# Patient Record
Sex: FEMALE | Race: OTHER | NOT HISPANIC OR LATINO | URBAN - METROPOLITAN AREA
[De-identification: names, ages, dates, MRNs, and addresses within clinical notes are randomized per-mention and may not be internally consistent; named-entity substitution may affect disease eponyms.]

---

## 2019-12-26 ENCOUNTER — EMERGENCY (EMERGENCY)
Facility: HOSPITAL | Age: 29
LOS: 1 days | Discharge: HOME | End: 2019-12-26
Attending: EMERGENCY MEDICINE
Payer: MEDICAID

## 2019-12-26 VITALS
DIASTOLIC BLOOD PRESSURE: 69 MMHG | TEMPERATURE: 98 F | SYSTOLIC BLOOD PRESSURE: 121 MMHG | HEART RATE: 76 BPM | OXYGEN SATURATION: 100 %

## 2019-12-26 VITALS
OXYGEN SATURATION: 100 % | WEIGHT: 184.97 LBS | HEART RATE: 81 BPM | TEMPERATURE: 98 F | HEIGHT: 63 IN | DIASTOLIC BLOOD PRESSURE: 83 MMHG | RESPIRATION RATE: 17 BRPM | SYSTOLIC BLOOD PRESSURE: 119 MMHG

## 2019-12-26 PROCEDURE — 99283 EMERGENCY DEPT VISIT LOW MDM: CPT

## 2019-12-26 PROCEDURE — 99283 EMERGENCY DEPT VISIT LOW MDM: CPT | Mod: 25

## 2019-12-26 PROCEDURE — 96372 THER/PROPH/DIAG INJ SC/IM: CPT

## 2019-12-26 RX ORDER — KETOROLAC TROMETHAMINE 30 MG/ML
30 SYRINGE (ML) INJECTION ONCE
Refills: 0 | Status: DISCONTINUED | OUTPATIENT
Start: 2019-12-26 | End: 2019-12-26

## 2019-12-26 RX ORDER — DIAZEPAM 5 MG
2 TABLET ORAL ONCE
Refills: 0 | Status: DISCONTINUED | OUTPATIENT
Start: 2019-12-26 | End: 2019-12-26

## 2019-12-26 RX ORDER — METHOCARBAMOL 500 MG/1
1 TABLET, FILM COATED ORAL
Qty: 20 | Refills: 0
Start: 2019-12-26 | End: 2019-12-30

## 2019-12-26 RX ORDER — GABAPENTIN 400 MG/1
400 CAPSULE ORAL ONCE
Refills: 0 | Status: COMPLETED | OUTPATIENT
Start: 2019-12-26 | End: 2019-12-26

## 2019-12-26 RX ADMIN — Medication 2 MILLIGRAM(S): at 17:17

## 2019-12-26 RX ADMIN — Medication 30 MILLIGRAM(S): at 17:47

## 2019-12-26 RX ADMIN — GABAPENTIN 400 MILLIGRAM(S): 400 CAPSULE ORAL at 17:17

## 2019-12-26 RX ADMIN — Medication 30 MILLIGRAM(S): at 17:17

## 2019-12-26 NOTE — ED ADULT NURSE NOTE - AREA
[FreeTextEntry1] : Patient examined, history and chart reviewed\par  discussed complications and benefits of injection in detail  patient consented to injection right foot \par injection of right foot with 0.5 cc betamethasone and 1cc of lidocaine 2%, patient demonstrated verbal \par Follow up in 4 weeks  
upper

## 2019-12-26 NOTE — ED PROVIDER NOTE - PATIENT PORTAL LINK FT
You can access the FollowMyHealth Patient Portal offered by [facility] by registering at the following website: [email or web address]. By joining [employer]’s FollowMyHealth portal, you will also be able to view your health information using other applications (apps) compatible with our system.

## 2019-12-26 NOTE — ED PROVIDER NOTE - OBJECTIVE STATEMENT
29 year old female presenting with left sided cervical back pain and occipital head pain x2 weeks. Patient states that the pain is at a 7-8/10. 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 Patient denies any history of trauma, nausea, vomiting, or any other acute complaints.

## 2019-12-26 NOTE — ED PROVIDER NOTE - MUSCULOSKELETAL, MLM
Patient with tenderness to left scapular area and posterior group of muscles. Reduced range of flexion and extension in posterior muscle groups.

## 2020-06-18 PROBLEM — Z78.9 OTHER SPECIFIED HEALTH STATUS: Chronic | Status: ACTIVE | Noted: 2019-12-26

## 2020-06-24 ENCOUNTER — ASOB RESULT (OUTPATIENT)
Age: 30
End: 2020-06-24

## 2020-06-24 ENCOUNTER — APPOINTMENT (OUTPATIENT)
Dept: ANTEPARTUM | Facility: CLINIC | Age: 30
End: 2020-06-24
Payer: MEDICAID

## 2020-06-24 PROCEDURE — 76813 OB US NUCHAL MEAS 1 GEST: CPT | Mod: 59

## 2020-06-24 PROCEDURE — 76801 OB US < 14 WKS SINGLE FETUS: CPT

## 2020-12-29 ENCOUNTER — TRANSCRIPTION ENCOUNTER (OUTPATIENT)
Age: 30
End: 2020-12-29

## 2020-12-30 ENCOUNTER — INPATIENT (INPATIENT)
Facility: HOSPITAL | Age: 30
LOS: 1 days | Discharge: HOME | End: 2021-01-01
Attending: OBSTETRICS & GYNECOLOGY | Admitting: OBSTETRICS & GYNECOLOGY
Payer: MEDICAID

## 2020-12-30 VITALS
DIASTOLIC BLOOD PRESSURE: 83 MMHG | HEART RATE: 94 BPM | RESPIRATION RATE: 19 BRPM | TEMPERATURE: 99 F | SYSTOLIC BLOOD PRESSURE: 134 MMHG

## 2020-12-30 DIAGNOSIS — O26.899 OTHER SPECIFIED PREGNANCY RELATED CONDITIONS, UNSPECIFIED TRIMESTER: ICD-10-CM

## 2020-12-30 DIAGNOSIS — Z34.80 ENCOUNTER FOR SUPERVISION OF OTHER NORMAL PREGNANCY, UNSPECIFIED TRIMESTER: ICD-10-CM

## 2020-12-30 DIAGNOSIS — O34.219 MATERNAL CARE FOR UNSPECIFIED TYPE SCAR FROM PREVIOUS CESAREAN DELIVERY: Chronic | ICD-10-CM

## 2020-12-30 DIAGNOSIS — Z3A.00 WEEKS OF GESTATION OF PREGNANCY NOT SPECIFIED: ICD-10-CM

## 2020-12-30 LAB
BASOPHILS # BLD AUTO: 0.02 K/UL — SIGNIFICANT CHANGE UP (ref 0–0.2)
BASOPHILS NFR BLD AUTO: 0.3 % — SIGNIFICANT CHANGE UP (ref 0–2)
EOSINOPHIL # BLD AUTO: 0.04 K/UL — SIGNIFICANT CHANGE UP (ref 0–0.5)
EOSINOPHIL NFR BLD AUTO: 0.6 % — SIGNIFICANT CHANGE UP (ref 0–6)
HCT VFR BLD CALC: 40 % — SIGNIFICANT CHANGE UP (ref 34.5–45)
HGB BLD-MCNC: 13.5 G/DL — SIGNIFICANT CHANGE UP (ref 11.5–15.5)
IMM GRANULOCYTES NFR BLD AUTO: 0.6 % — SIGNIFICANT CHANGE UP (ref 0–1.5)
LYMPHOCYTES # BLD AUTO: 1.4 K/UL — SIGNIFICANT CHANGE UP (ref 1–3.3)
LYMPHOCYTES # BLD AUTO: 21 % — SIGNIFICANT CHANGE UP (ref 13–44)
MCHC RBC-ENTMCNC: 30.3 PG — SIGNIFICANT CHANGE UP (ref 27–34)
MCHC RBC-ENTMCNC: 33.8 GM/DL — SIGNIFICANT CHANGE UP (ref 32–36)
MCV RBC AUTO: 89.9 FL — SIGNIFICANT CHANGE UP (ref 80–100)
MONOCYTES # BLD AUTO: 0.75 K/UL — SIGNIFICANT CHANGE UP (ref 0–0.9)
MONOCYTES NFR BLD AUTO: 11.2 % — SIGNIFICANT CHANGE UP (ref 2–14)
NEUTROPHILS # BLD AUTO: 4.43 K/UL — SIGNIFICANT CHANGE UP (ref 1.8–7.4)
NEUTROPHILS NFR BLD AUTO: 66.3 % — SIGNIFICANT CHANGE UP (ref 43–77)
NRBC # BLD: 0 /100 WBCS — SIGNIFICANT CHANGE UP (ref 0–0)
PLATELET # BLD AUTO: 328 K/UL — SIGNIFICANT CHANGE UP (ref 150–400)
RBC # BLD: 4.45 M/UL — SIGNIFICANT CHANGE UP (ref 3.8–5.2)
RBC # FLD: 12.5 % — SIGNIFICANT CHANGE UP (ref 10.3–14.5)
RH IG SCN BLD-IMP: POSITIVE — SIGNIFICANT CHANGE UP
T PALLIDUM AB TITR SER: NEGATIVE — SIGNIFICANT CHANGE UP
WBC # BLD: 6.68 K/UL — SIGNIFICANT CHANGE UP (ref 3.8–10.5)
WBC # FLD AUTO: 6.68 K/UL — SIGNIFICANT CHANGE UP (ref 3.8–10.5)

## 2020-12-30 PROCEDURE — 88302 TISSUE EXAM BY PATHOLOGIST: CPT | Mod: 26

## 2020-12-30 PROCEDURE — 58700 REMOVAL OF FALLOPIAN TUBE: CPT | Mod: AS

## 2020-12-30 PROCEDURE — 59514 CESAREAN DELIVERY ONLY: CPT | Mod: AS,U9

## 2020-12-30 RX ORDER — ACETAMINOPHEN 500 MG
975 TABLET ORAL
Refills: 0 | Status: DISCONTINUED | OUTPATIENT
Start: 2020-12-30 | End: 2021-01-01

## 2020-12-30 RX ORDER — AMPICILLIN TRIHYDRATE 250 MG
2 CAPSULE ORAL ONCE
Refills: 0 | Status: COMPLETED | OUTPATIENT
Start: 2020-12-30 | End: 2020-12-30

## 2020-12-30 RX ORDER — AMPICILLIN TRIHYDRATE 250 MG
1 CAPSULE ORAL EVERY 4 HOURS
Refills: 0 | Status: DISCONTINUED | OUTPATIENT
Start: 2020-12-30 | End: 2020-12-30

## 2020-12-30 RX ORDER — MAGNESIUM HYDROXIDE 400 MG/1
30 TABLET, CHEWABLE ORAL
Refills: 0 | Status: DISCONTINUED | OUTPATIENT
Start: 2020-12-30 | End: 2021-01-01

## 2020-12-30 RX ORDER — SIMETHICONE 80 MG/1
80 TABLET, CHEWABLE ORAL EVERY 4 HOURS
Refills: 0 | Status: DISCONTINUED | OUTPATIENT
Start: 2020-12-30 | End: 2021-01-01

## 2020-12-30 RX ORDER — INFLUENZA VIRUS VACCINE 15; 15; 15; 15 UG/.5ML; UG/.5ML; UG/.5ML; UG/.5ML
0.5 SUSPENSION INTRAMUSCULAR ONCE
Refills: 0 | Status: COMPLETED | OUTPATIENT
Start: 2020-12-30 | End: 2020-12-30

## 2020-12-30 RX ORDER — SODIUM CHLORIDE 9 MG/ML
1000 INJECTION, SOLUTION INTRAVENOUS
Refills: 0 | Status: DISCONTINUED | OUTPATIENT
Start: 2020-12-30 | End: 2020-12-30

## 2020-12-30 RX ORDER — IBUPROFEN 200 MG
600 TABLET ORAL EVERY 6 HOURS
Refills: 0 | Status: COMPLETED | OUTPATIENT
Start: 2020-12-30 | End: 2021-11-28

## 2020-12-30 RX ORDER — KETOROLAC TROMETHAMINE 30 MG/ML
30 SYRINGE (ML) INJECTION EVERY 6 HOURS
Refills: 0 | Status: DISCONTINUED | OUTPATIENT
Start: 2020-12-30 | End: 2021-01-01

## 2020-12-30 RX ORDER — HEPARIN SODIUM 5000 [USP'U]/ML
5000 INJECTION INTRAVENOUS; SUBCUTANEOUS EVERY 12 HOURS
Refills: 0 | Status: DISCONTINUED | OUTPATIENT
Start: 2020-12-30 | End: 2021-01-01

## 2020-12-30 RX ORDER — SODIUM CHLORIDE 9 MG/ML
1000 INJECTION, SOLUTION INTRAVENOUS
Refills: 0 | Status: DISCONTINUED | OUTPATIENT
Start: 2020-12-30 | End: 2020-12-31

## 2020-12-30 RX ORDER — METOCLOPRAMIDE HCL 10 MG
10 TABLET ORAL ONCE
Refills: 0 | Status: DISCONTINUED | OUTPATIENT
Start: 2020-12-30 | End: 2020-12-30

## 2020-12-30 RX ORDER — DIPHENHYDRAMINE HCL 50 MG
25 CAPSULE ORAL EVERY 6 HOURS
Refills: 0 | Status: DISCONTINUED | OUTPATIENT
Start: 2020-12-30 | End: 2021-01-01

## 2020-12-30 RX ORDER — SODIUM CHLORIDE 9 MG/ML
1000 INJECTION, SOLUTION INTRAVENOUS ONCE
Refills: 0 | Status: COMPLETED | OUTPATIENT
Start: 2020-12-30 | End: 2020-12-30

## 2020-12-30 RX ORDER — OXYCODONE HYDROCHLORIDE 5 MG/1
5 TABLET ORAL ONCE
Refills: 0 | Status: DISCONTINUED | OUTPATIENT
Start: 2020-12-30 | End: 2021-01-01

## 2020-12-30 RX ORDER — FAMOTIDINE 10 MG/ML
20 INJECTION INTRAVENOUS ONCE
Refills: 0 | Status: COMPLETED | OUTPATIENT
Start: 2020-12-30 | End: 2020-12-30

## 2020-12-30 RX ORDER — OXYTOCIN 10 UNIT/ML
333.33 VIAL (ML) INJECTION
Qty: 20 | Refills: 0 | Status: DISCONTINUED | OUTPATIENT
Start: 2020-12-30 | End: 2020-12-30

## 2020-12-30 RX ORDER — OXYCODONE HYDROCHLORIDE 5 MG/1
5 TABLET ORAL
Refills: 0 | Status: DISCONTINUED | OUTPATIENT
Start: 2020-12-30 | End: 2021-01-01

## 2020-12-30 RX ORDER — TETANUS TOXOID, REDUCED DIPHTHERIA TOXOID AND ACELLULAR PERTUSSIS VACCINE, ADSORBED 5; 2.5; 8; 8; 2.5 [IU]/.5ML; [IU]/.5ML; UG/.5ML; UG/.5ML; UG/.5ML
0.5 SUSPENSION INTRAMUSCULAR ONCE
Refills: 0 | Status: COMPLETED | OUTPATIENT
Start: 2020-12-30

## 2020-12-30 RX ORDER — LANOLIN
1 OINTMENT (GRAM) TOPICAL EVERY 6 HOURS
Refills: 0 | Status: DISCONTINUED | OUTPATIENT
Start: 2020-12-30 | End: 2021-01-01

## 2020-12-30 RX ORDER — CITRIC ACID/SODIUM CITRATE 300-500 MG
30 SOLUTION, ORAL ORAL ONCE
Refills: 0 | Status: COMPLETED | OUTPATIENT
Start: 2020-12-30 | End: 2020-12-30

## 2020-12-30 RX ORDER — OXYTOCIN 10 UNIT/ML
333.33 VIAL (ML) INJECTION
Qty: 20 | Refills: 0 | Status: DISCONTINUED | OUTPATIENT
Start: 2020-12-30 | End: 2021-01-01

## 2020-12-30 RX ADMIN — SODIUM CHLORIDE 125 MILLILITER(S): 9 INJECTION, SOLUTION INTRAVENOUS at 10:16

## 2020-12-30 RX ADMIN — SODIUM CHLORIDE 2000 MILLILITER(S): 9 INJECTION, SOLUTION INTRAVENOUS at 08:45

## 2020-12-30 RX ADMIN — FAMOTIDINE 20 MILLIGRAM(S): 10 INJECTION INTRAVENOUS at 11:21

## 2020-12-30 RX ADMIN — Medication 30 MILLILITER(S): at 11:20

## 2020-12-30 RX ADMIN — Medication 30 MILLIGRAM(S): at 22:20

## 2020-12-30 RX ADMIN — Medication 30 MILLIGRAM(S): at 14:43

## 2020-12-30 RX ADMIN — Medication 216 GRAM(S): at 09:45

## 2020-12-30 RX ADMIN — HEPARIN SODIUM 5000 UNIT(S): 5000 INJECTION INTRAVENOUS; SUBCUTANEOUS at 21:43

## 2020-12-30 RX ADMIN — Medication 30 MILLIGRAM(S): at 21:43

## 2020-12-30 NOTE — OB NEONATOLOGY/PEDIATRICIAN DELIVERY SUMMARY - NS_GESTAGEATBIRTHA_OBGYN_ALL_OB_FT
39w
(1) Visual, tactile, auditory, spatial, or personal inattention or extinction to bilateral simultaneous stimulation in one of the sensory modalities

## 2020-12-30 NOTE — PRE-ANESTHESIA EVALUATION ADULT - MALLAMPATI CLASS
Mildly to Moderately Impaired: difficulty hearing in some environments or speaker may need to increase volume or speak distinctly Class I (easy) - visualization of the soft palate, fauces, uvula, and both anterior and posterior pillars

## 2020-12-30 NOTE — OB PROVIDER H&P - NSHPPHYSICALEXAM_GEN_ALL_CORE
PE  Gen distress w/ ctx  Vital Signs Last 24 Hrs  T(C): 37.1 (30 Dec 2020 08:26), Max: 37.1 (30 Dec 2020 08:06)  T(F): 98.8 (30 Dec 2020 08:26), Max: 98.8 (30 Dec 2020 08:06)  HR: 84 (30 Dec 2020 08:44) (84 - 96)  BP: 134/83 (30 Dec 2020 08:26) (134/83 - 134/83)  BP(mean): --  RR: 19 (30 Dec 2020 08:26) (18 - 19)  SpO2: 98% (30 Dec 2020 08:44) (95% - 100%)  / mod nelsy/ (+) accel/ (-) decel  [place] q 5-7  CV RRR  Lungs CTA b/l  Abd soft NT, gravid  LE soft, NT  VE 3/70/-2

## 2020-12-30 NOTE — OB NEONATOLOGY/PEDIATRICIAN DELIVERY SUMMARY - NSPEDSNEONOTESA_OBGYN_ALL_OB_FT
Veronica Berger is a 39 wk F born to a 33 y/o AB+  via rCS. Mother is COVID-19 pos. Maternal history unremarkable. Pregnancy unremarkable. PNL neg/NR/immune. GBS pos on . Antibiotics were (Amp x1; 0945) given. AROM for 0 hours with clear fluid. Baby born crying and vigorous, and was warmed, dried, stimulated, and suctioned. APGARS 8 and 9. EOS 0.02 (Maternal T 37C). Will be bottlefed. Guardian consents to Hep B.

## 2020-12-30 NOTE — BRIEF OPERATIVE NOTE - NSICDXBRIEFPROCEDURE_GEN_ALL_CORE_FT
PROCEDURES:   section 30-Dec-2020 14:19:12  Eva Mercado  Bilateral salpingectomy 30-Dec-2020 14:21:45  Eva Mercado

## 2020-12-30 NOTE — PRE-ANESTHESIA EVALUATION ADULT - NS MD HP INPLANTS MED DEV
Please send the letter to the patient with the following.         Here are your results for your recent labs.  Your kidney functions are stable.   Your hepatitis C testing was normal.   Your total cholesterol and LDL (bad cholesterol) are elevated. Based on our discussion please follow up in three months to recheck your levels.    As you may know, abnormal cholesterol is one factor that increases your risk for heart disease and stroke. You can improve your cholesterol by controlling the amount and type of fat you eat and by increasing your daily activity level.    Here are some ways to improve your nutrition   Eat less fat (especially butter, Crisco and other saturated fats)  Buy lean cuts of meat; reduce your portions of red meat or substitute poultry or fish  Use skim milk and low-fat dairy products  Eat no more than 4 egg yolks per week  Avoid fried or fast foods that are high in fat  Eat more fruits and vegetables    Also consider starting or increasing your aerobic activity; this is the best way to improve HDL (good) cholesterol. If aerobic activity would be new to you, please talk with me first about what activities are safe for you.  Please call or message me if you have questions or concerns.      None

## 2020-12-30 NOTE — OB PROVIDER H&P - HISTORY OF PRESENT ILLNESS
29yo  @ 39wks h/o prior c/section in labor for repeat c/section. (+) fetal movement (+) painful ctx q 5min. Denies leakage of fluid or vaginal bleeding. PNC uncomplicated.  Denies fevers/ chills/ coughs.  Covid (+)  GBS (+)  EFW 3500  Ax: NKDA  MedHx: denies  Meds: PNV  ObHx: 2018 pLTCS FTD, F 8#5oz  GynHx: denies abnl paps/ cysts/ fibroids/ endometriosis/ HPV  SurgHx: LTCS  FamHx: denies  Pt accepts blood products    PE  Gen distress w/ ctx  Vital Signs Last 24 Hrs  T(C): 37.1 (30 Dec 2020 08:26), Max: 37.1 (30 Dec 2020 08:06)  T(F): 98.8 (30 Dec 2020 08:26), Max: 98.8 (30 Dec 2020 08:06)  HR: 84 (30 Dec 2020 08:44) (84 - 96)  BP: 134/83 (30 Dec 2020 08:26) (134/83 - 134/83)  BP(mean): --  RR: 19 (30 Dec 2020 08:26) (18 - 19)  SpO2: 98% (30 Dec 2020 08:44) (95% - 100%)  / mod nelsy/ (+) accel/ (-) decel  [place] q 5-7  CV RRR  Lungs CTA b/l  Abd soft NT, gravid  LE soft, NT  VE 3/70/-2    Plan:  Admit to L&D  EFM/[place]  Routine labs  IVFluids  NPO/Bicitra  Anesthesia c/s  PreOp Prep  Dr. Velazquez aware and in-house

## 2020-12-30 NOTE — OB RN DELIVERY SUMMARY - NS_SEPSISRSKCALC_OBGYN_ALL_OB_FT
EOS calculated successfully. EOS Risk Factor: 0.5/1000 live births ([facility] national incidence); GA=39w;Temp=98.8; ROM=0.017; GBS='Positive'; Antibiotics='Broad spectrum antibiotics 2-3.9 hrs prior to birth'

## 2020-12-30 NOTE — OB PROVIDER H&P - ASSESSMENT
31yo  @ 39wks h/o prior c/section in labor for repeat c/section. (+) fetal movement (+) painful ctx q 5min. Denies leakage of fluid or vaginal bleeding. PNC uncomplicated.  Denies fevers/ chills/ coughs.  Covid (+)  GBS (+)  EFW 3500  Ax: NKDA  MedHx: denies  Meds: PNV  ObHx: 2018 pLTCS FTD, F 8#5oz  GynHx: denies abnl paps/ cysts/ fibroids/ endometriosis/ HPV  SurgHx: LTCS  FamHx: denies  Pt accepts blood products    PE  Gen distress w/ ctx  Vital Signs Last 24 Hrs  T(C): 37.1 (30 Dec 2020 08:26), Max: 37.1 (30 Dec 2020 08:06)  T(F): 98.8 (30 Dec 2020 08:26), Max: 98.8 (30 Dec 2020 08:06)  HR: 84 (30 Dec 2020 08:44) (84 - 96)  BP: 134/83 (30 Dec 2020 08:26) (134/83 - 134/83)  BP(mean): --  RR: 19 (30 Dec 2020 08:26) (18 - 19)  SpO2: 98% (30 Dec 2020 08:44) (95% - 100%)  / mod nelsy/ (+) accel/ (-) decel  [place] q 5-7  CV RRR  Lungs CTA b/l  Abd soft NT, gravid  LE soft, NT  VE 3/70/-2    Plan:  Admit to L&D  EFM/[place]  Routine labs  IVFluids  NPO/Bicitra  Anesthesia c/s  PreOp Prep  Dr. Velazquez aware and in-house

## 2020-12-30 NOTE — OB PROVIDER DELIVERY SUMMARY - NSPROVIDERDELIVERYNOTE_OBGYN_ALL_OB_FT
repeat LTCS in labor   Viable female infant, Apgars 8/9, weight 3698g  Hysterotomy closed in 1 layer using caprosyn  Grossly normal uterus, tubes, and ovaries  B/L salpingectomy using Ligasure device  Abdomen closed in standard fashion  Pt and infant to recovery in stable condition  L and R tubes to pathology  EBL: 600   IVF: 1500    UOP: 250

## 2020-12-30 NOTE — OB PROVIDER H&P - NS_ADMITREASON_OBGYN_ALL_OB
Review of Systems/Medical History  Patient summary reviewed        Cardiovascular  Negative cardio ROS    Pulmonary  Negative pulmonary ROS        GI/Hepatic  Negative GI/hepatic ROS          Negative  ROS        Endo/Other  Negative endo/other ROS   Obesity  morbid obesity   GYN  Negative gynecology ROS          Hematology  Negative hematology ROS      Musculoskeletal  Negative musculoskeletal ROS        Neurology  Negative neurology ROS      Psychology   Negative psychology ROS              Physical Exam    Airway    Mallampati score: III  TM Distance: >3 FB  Neck ROM: full     Dental   No notable dental hx     Cardiovascular  Comment: Negative ROS, Rhythm: regular, Rate: normal, Cardiovascular exam normal    Pulmonary  Pulmonary exam normal Breath sounds clear to auscultation,     Other Findings        Anesthesia Plan  ASA Score- 3     Anesthesia Type- general with ASA Monitors  Additional Monitors:   Airway Plan:         Plan Factors-    Induction- intravenous  Postoperative Plan-     Informed Consent- Anesthetic plan and risks discussed with patient  Labor at Term

## 2020-12-31 ENCOUNTER — TRANSCRIPTION ENCOUNTER (OUTPATIENT)
Age: 30
End: 2020-12-31

## 2020-12-31 LAB
BASOPHILS # BLD AUTO: 0.02 K/UL — SIGNIFICANT CHANGE UP (ref 0–0.2)
BASOPHILS NFR BLD AUTO: 0.2 % — SIGNIFICANT CHANGE UP (ref 0–2)
EOSINOPHIL # BLD AUTO: 0.01 K/UL — SIGNIFICANT CHANGE UP (ref 0–0.5)
EOSINOPHIL NFR BLD AUTO: 0.1 % — SIGNIFICANT CHANGE UP (ref 0–6)
HCT VFR BLD CALC: 34.5 % — SIGNIFICANT CHANGE UP (ref 34.5–45)
HGB BLD-MCNC: 11.2 G/DL — LOW (ref 11.5–15.5)
IMM GRANULOCYTES NFR BLD AUTO: 0.4 % — SIGNIFICANT CHANGE UP (ref 0–1.5)
LYMPHOCYTES # BLD AUTO: 1.7 K/UL — SIGNIFICANT CHANGE UP (ref 1–3.3)
LYMPHOCYTES # BLD AUTO: 18.3 % — SIGNIFICANT CHANGE UP (ref 13–44)
MCHC RBC-ENTMCNC: 30.5 PG — SIGNIFICANT CHANGE UP (ref 27–34)
MCHC RBC-ENTMCNC: 32.5 GM/DL — SIGNIFICANT CHANGE UP (ref 32–36)
MCV RBC AUTO: 94 FL — SIGNIFICANT CHANGE UP (ref 80–100)
MONOCYTES # BLD AUTO: 1.08 K/UL — HIGH (ref 0–0.9)
MONOCYTES NFR BLD AUTO: 11.6 % — SIGNIFICANT CHANGE UP (ref 2–14)
NEUTROPHILS # BLD AUTO: 6.43 K/UL — SIGNIFICANT CHANGE UP (ref 1.8–7.4)
NEUTROPHILS NFR BLD AUTO: 69.4 % — SIGNIFICANT CHANGE UP (ref 43–77)
NRBC # BLD: 0 /100 WBCS — SIGNIFICANT CHANGE UP (ref 0–0)
PLATELET # BLD AUTO: 279 K/UL — SIGNIFICANT CHANGE UP (ref 150–400)
RBC # BLD: 3.67 M/UL — LOW (ref 3.8–5.2)
RBC # FLD: 12.6 % — SIGNIFICANT CHANGE UP (ref 10.3–14.5)
SARS-COV-2 IGG SERPL QL IA: POSITIVE
SARS-COV-2 IGM SERPL IA-ACNC: 7.54 INDEX — HIGH
WBC # BLD: 9.28 K/UL — SIGNIFICANT CHANGE UP (ref 3.8–10.5)
WBC # FLD AUTO: 9.28 K/UL — SIGNIFICANT CHANGE UP (ref 3.8–10.5)

## 2020-12-31 RX ORDER — NALOXONE HYDROCHLORIDE 4 MG/.1ML
0.1 SPRAY NASAL
Refills: 0 | Status: DISCONTINUED | OUTPATIENT
Start: 2020-12-31 | End: 2020-12-31

## 2020-12-31 RX ORDER — NALBUPHINE HYDROCHLORIDE 10 MG/ML
2.5 INJECTION, SOLUTION INTRAMUSCULAR; INTRAVENOUS; SUBCUTANEOUS EVERY 6 HOURS
Refills: 0 | Status: DISCONTINUED | OUTPATIENT
Start: 2020-12-31 | End: 2020-12-31

## 2020-12-31 RX ORDER — MORPHINE SULFATE 50 MG/1
2 CAPSULE, EXTENDED RELEASE ORAL ONCE
Refills: 0 | Status: DISCONTINUED | OUTPATIENT
Start: 2020-12-31 | End: 2020-12-31

## 2020-12-31 RX ORDER — ACETAMINOPHEN 500 MG
3 TABLET ORAL
Qty: 0 | Refills: 0 | DISCHARGE
Start: 2020-12-31

## 2020-12-31 RX ORDER — OXYCODONE HYDROCHLORIDE 5 MG/1
5 TABLET ORAL
Refills: 0 | Status: DISCONTINUED | OUTPATIENT
Start: 2020-12-31 | End: 2020-12-31

## 2020-12-31 RX ORDER — ONDANSETRON 8 MG/1
4 TABLET, FILM COATED ORAL EVERY 6 HOURS
Refills: 0 | Status: DISCONTINUED | OUTPATIENT
Start: 2020-12-31 | End: 2020-12-31

## 2020-12-31 RX ORDER — DEXAMETHASONE 0.5 MG/5ML
4 ELIXIR ORAL EVERY 6 HOURS
Refills: 0 | Status: DISCONTINUED | OUTPATIENT
Start: 2020-12-31 | End: 2020-12-31

## 2020-12-31 RX ORDER — OXYCODONE HYDROCHLORIDE 5 MG/1
1 TABLET ORAL
Qty: 0 | Refills: 0 | DISCHARGE
Start: 2020-12-31

## 2020-12-31 RX ORDER — IBUPROFEN 200 MG
1 TABLET ORAL
Qty: 0 | Refills: 0 | DISCHARGE
Start: 2020-12-31

## 2020-12-31 RX ADMIN — HEPARIN SODIUM 5000 UNIT(S): 5000 INJECTION INTRAVENOUS; SUBCUTANEOUS at 08:24

## 2020-12-31 RX ADMIN — Medication 975 MILLIGRAM(S): at 06:25

## 2020-12-31 RX ADMIN — Medication 975 MILLIGRAM(S): at 01:25

## 2020-12-31 RX ADMIN — Medication 975 MILLIGRAM(S): at 11:45

## 2020-12-31 RX ADMIN — Medication 30 MILLIGRAM(S): at 21:06

## 2020-12-31 RX ADMIN — Medication 975 MILLIGRAM(S): at 18:12

## 2020-12-31 RX ADMIN — Medication 30 MILLIGRAM(S): at 22:06

## 2020-12-31 RX ADMIN — Medication 30 MILLIGRAM(S): at 08:34

## 2020-12-31 RX ADMIN — Medication 30 MILLIGRAM(S): at 03:30

## 2020-12-31 RX ADMIN — Medication 30 MILLIGRAM(S): at 15:01

## 2020-12-31 RX ADMIN — Medication 30 MILLIGRAM(S): at 15:45

## 2020-12-31 RX ADMIN — Medication 30 MILLIGRAM(S): at 04:10

## 2020-12-31 RX ADMIN — Medication 975 MILLIGRAM(S): at 00:41

## 2020-12-31 RX ADMIN — Medication 30 MILLIGRAM(S): at 09:15

## 2020-12-31 RX ADMIN — NALBUPHINE HYDROCHLORIDE 2.5 MILLIGRAM(S): 10 INJECTION, SOLUTION INTRAMUSCULAR; INTRAVENOUS; SUBCUTANEOUS at 03:31

## 2020-12-31 RX ADMIN — HEPARIN SODIUM 5000 UNIT(S): 5000 INJECTION INTRAVENOUS; SUBCUTANEOUS at 21:06

## 2020-12-31 RX ADMIN — Medication 975 MILLIGRAM(S): at 12:15

## 2020-12-31 RX ADMIN — Medication 975 MILLIGRAM(S): at 17:41

## 2020-12-31 RX ADMIN — NALBUPHINE HYDROCHLORIDE 2.5 MILLIGRAM(S): 10 INJECTION, SOLUTION INTRAMUSCULAR; INTRAVENOUS; SUBCUTANEOUS at 02:33

## 2020-12-31 NOTE — DISCHARGE NOTE OB - MEDICATION SUMMARY - MEDICATIONS TO TAKE
I will START or STAY ON the medications listed below when I get home from the hospital:    acetaminophen 325 mg oral tablet  -- 3 tab(s) by mouth   -- Indication: For pain    ibuprofen 600 mg oral tablet  -- 1 tab(s) by mouth every 6 hours  -- Indication: For pain    oxyCODONE 5 mg oral tablet  -- 1 tab(s) by mouth every 3 hours, As needed, Moderate to Severe Pain (4-10)  -- Indication: For pain more intense

## 2020-12-31 NOTE — DISCHARGE NOTE OB - CARE PLAN
Principal Discharge DX:	Delivery with history of   Goal:	delivered viable female infant low flap c section  Assessment and plan of treatment:	continue post op care  Secondary Diagnosis:	Multiparity  Goal:	bilateral salpingectomy

## 2020-12-31 NOTE — DISCHARGE NOTE OB - PATIENT PORTAL LINK FT
You can access the FollowMyHealth Patient Portal offered by [facility] by registering at the following website: [email or web address]. By joining [employer]’s FollowMyHealth portal, you will also be able to view your health information using other applications (apps) compatible with our system.

## 2020-12-31 NOTE — DISCHARGE NOTE OB - MATERIALS PROVIDED
Post birth warning s/s/Guide to Postpartum Care Post birth warning s/s/[facility]  Screening Program/  Immunization Record/Guide to Postpartum Care

## 2020-12-31 NOTE — DISCHARGE NOTE OB - MEDICATION SUMMARY - MEDICATIONS TO STOP TAKING
I will STOP taking the medications listed below when I get home from the hospital:    naproxen 500 mg oral tablet  -- 1 tab(s) by mouth 2 times a day   -- Check with your doctor before becoming pregnant.  May cause drowsiness or dizziness.  Obtain medical advice before taking any non-prescription drugs as some may affect the action of this medication.  Take with food or milk.

## 2020-12-31 NOTE — DISCHARGE NOTE OB - HOSPITAL COURSE
the patient was admitted in labor for repeat c section and tubal sterilization,  she was found on admission to be covid +.  She had no symptoms.  She delivered a viable female infant, from low flap transverse c section.  Bilateral salpingectomy performed for sterilization.  She had an uncomplicated post op course, RH+

## 2020-12-31 NOTE — PROGRESS NOTE ADULT - SUBJECTIVE AND OBJECTIVE BOX
OB Progress Note:  Delivery, POD#1    S: 31yo POD#1 s/p LTCS . Her pain is well controlled. She is tolerating a regular diet. Has gotten out of bed. Cao catheter removed at 330am, has yet to void. Has not passed flatus. No headache, RUQ pain, or vision changes. Denies chest pain, SOB, dizziness, lightheadedness, n/v, fever/chills, or any other concerns. No heavy vaginal bleeding.     O:   Vital Signs Last 24 Hrs  T(C): 36.6 (31 Dec 2020 05:43), Max: 37.2 (30 Dec 2020 18:50)  T(F): 97.8 (31 Dec 2020 05:43), Max: 99 (30 Dec 2020 18:50)  HR: 62 (31 Dec 2020 05:43) (62 - 112)  BP: 108/74 (31 Dec 2020 05:43) (90/44 - 157/74)  BP(mean): --  RR: 19 (31 Dec 2020 05:43) (15 - 20)  SpO2: 100% (31 Dec 2020 05:43) (93% - 100%)    PE:  General: NAD  Abdomen: soft, discomfort with palpation, bandage removed, incision c/d/i with steri strips  Extremities: No erythema, no pitting edema    Labs:  Blood type: AB Positive  Rubella IgG: RPR: Negative                          13.5   6.68 >-----------< 328    ( 12-30 @ 09:20 )             40.0          A/P: 31yo COVID+ POD#1 s/p rLTCS/BTL.  Patient is stable and progressing towards post op goals.   - DTV by 930am  - Increase ambulation  - Continue regular diet.  - Continue motrin, tylenol, oxycodone PRN for pain control  - F/u AM CBC    Zakiya Garcia, PGY1 OB Progress Note:  Delivery, POD#1    S: 31yo POD#1 s/p LTCS, bilateral salpingectomy . Her pain is well controlled. She is tolerating a regular diet. Has gotten out of bed. Cao catheter removed at 330am, has yet to void. Has not passed flatus. No headache, RUQ pain, or vision changes. Denies chest pain, SOB, dizziness, lightheadedness, n/v, fever/chills, or any other concerns. No heavy vaginal bleeding.     O:   Vital Signs Last 24 Hrs  T(C): 36.6 (31 Dec 2020 05:43), Max: 37.2 (30 Dec 2020 18:50)  T(F): 97.8 (31 Dec 2020 05:43), Max: 99 (30 Dec 2020 18:50)  HR: 62 (31 Dec 2020 05:43) (62 - 112)  BP: 108/74 (31 Dec 2020 05:43) (90/44 - 157/74)  BP(mean): --  RR: 19 (31 Dec 2020 05:43) (15 - 20)  SpO2: 100% (31 Dec 2020 05:43) (93% - 100%)    PE:  General: NAD  Abdomen: soft, discomfort with palpation, bandage removed, incision c/d/i with steri strips  Extremities: No erythema, no pitting edema    Labs:  Blood type: AB Positive  Rubella IgG: RPR: Negative                          13.5   6.68 >-----------< 328    ( 12-30 @ 09:20 )             40.0          A/P: 31yo COVID+ POD#1 s/p rLTCS/BTL.  Patient is stable and progressing towards post op goals.   - DTV by 930am  - Increase ambulation  - Continue regular diet.  - Continue motrin, tylenol, oxycodone PRN for pain control  - F/u AM CBC   covid isolation reviewed with patient    Zakiya Garcia, PGY1

## 2020-12-31 NOTE — DISCHARGE NOTE OB - CARE PROVIDER_API CALL
Angelic Pastor  OBSTETRICS AND GYNECOLOGY  [address]  [place] [address]  Phone: [phone]  Fax: [phone]  Follow Up Time:

## 2020-12-31 NOTE — PROGRESS NOTE ADULT - SUBJECTIVE AND OBJECTIVE BOX
Day 1 of Anesthesia Pain Management Service    SUBJECTIVE: Doing ok  Pain Scale Score:          [X] Refer to charted pain scores    THERAPY:  s/p   2  mg PF epidural morphine on 12\30\2020      MEDICATIONS  (STANDING):  acetaminophen   Tablet .. 975 milliGRAM(s) Oral <User Schedule>  diphtheria/tetanus/pertussis (acellular) Vaccine (ADAcel) 0.5 milliLiter(s) IntraMuscular once  heparin   Injectable 5000 Unit(s) SubCutaneous every 12 hours  ibuprofen  Tablet. 600 milliGRAM(s) Oral every 6 hours  influenza   Vaccine 0.5 milliLiter(s) IntraMuscular once  ketorolac   Injectable 30 milliGRAM(s) IV Push every 6 hours  lactated ringers. 1000 milliLiter(s) (125 mL/Hr) IV Continuous <Continuous>  morphine PF Epidural 2 milliGRAM(s) Epidural once  oxytocin Infusion 333.333 milliUNIT(s)/Min (1000 mL/Hr) IV Continuous <Continuous>    MEDICATIONS  (PRN):  dexAMETHasone  Injectable 4 milliGRAM(s) IV Push every 6 hours PRN Nausea  diphenhydrAMINE 25 milliGRAM(s) Oral every 6 hours PRN Pruritus  lanolin Ointment 1 Application(s) Topical every 6 hours PRN Sore Nipples  magnesium hydroxide Suspension 30 milliLiter(s) Oral two times a day PRN Constipation  nalbuphine Injectable 2.5 milliGRAM(s) IV Push every 6 hours PRN Pruritus  naloxone Injectable 0.1 milliGRAM(s) IV Push every 3 minutes PRN For ANY of the following changes in patient status:  A. Breaths Per Minute LESS THAN 10, B. Oxygen saturation LESS THAN 90%, C. Sedation score of 6 for Stop After: 4 Times  ondansetron Injectable 4 milliGRAM(s) IV Push every 6 hours PRN Nausea  oxyCODONE    IR 5 milliGRAM(s) Oral every 3 hours PRN Moderate to Severe Pain (4-10)  oxyCODONE    IR 5 milliGRAM(s) Oral once PRN Moderate to Severe Pain (4-10)  oxyCODONE    IR 5 milliGRAM(s) Oral every 3 hours PRN Mild Pain (1 - 3)  simethicone 80 milliGRAM(s) Chew every 4 hours PRN Gas      OBJECTIVE:    Sedation:        	[X] Alert	 [ ] Drowsy	[ ] Arousable      [ ] Asleep       [ ] Unresponsive    Side Effects:	[  ] None 	[ ] Nausea	[ ] Vomiting         [ X] Pruritus  		[ ] Weakness            [ ] Numbness	          [ ] Other:    Vital Signs Last 24 Hrs  T(C): 36.8 (31 Dec 2020 08:30), Max: 37.2 (30 Dec 2020 18:50)  T(F): 98.3 (31 Dec 2020 08:30), Max: 99 (30 Dec 2020 18:50)  HR: 65 (31 Dec 2020 08:30) (62 - 112)  BP: 110/76 (31 Dec 2020 08:30) (90/44 - 157/74)  BP(mean): --  RR: 18 (31 Dec 2020 08:30) (15 - 20)  SpO2: 100% (31 Dec 2020 05:43) (93% - 100%)    ASSESSMENT/ PLAN  [X] Patient transitioned to prn analgesics later today  [X] Pain management per primary service, pain service to sign off   [X]Documentation and Verification of current medications

## 2020-12-31 NOTE — DISCHARGE NOTE OB - INSTRUCTIONS
Keep your follow up appointment with doctor as instructed and call doctor with any questions or concerns. Keep your follow up appointment with doctor as instructed and call doctor with any respiratory discomfort or any questions or concerns.

## 2021-01-01 VITALS
TEMPERATURE: 98 F | HEART RATE: 83 BPM | OXYGEN SATURATION: 100 % | SYSTOLIC BLOOD PRESSURE: 120 MMHG | RESPIRATION RATE: 18 BRPM | DIASTOLIC BLOOD PRESSURE: 79 MMHG

## 2021-01-01 DIAGNOSIS — U07.1 COVID-19: ICD-10-CM

## 2021-01-01 PROCEDURE — G0463: CPT

## 2021-01-01 PROCEDURE — 86900 BLOOD TYPING SEROLOGIC ABO: CPT

## 2021-01-01 PROCEDURE — 88302 TISSUE EXAM BY PATHOLOGIST: CPT

## 2021-01-01 PROCEDURE — 59050 FETAL MONITOR W/REPORT: CPT

## 2021-01-01 PROCEDURE — 59025 FETAL NON-STRESS TEST: CPT

## 2021-01-01 PROCEDURE — 93971 EXTREMITY STUDY: CPT

## 2021-01-01 PROCEDURE — 86850 RBC ANTIBODY SCREEN: CPT

## 2021-01-01 PROCEDURE — 93971 EXTREMITY STUDY: CPT | Mod: 26,RT

## 2021-01-01 PROCEDURE — 86780 TREPONEMA PALLIDUM: CPT

## 2021-01-01 PROCEDURE — 86769 SARS-COV-2 COVID-19 ANTIBODY: CPT

## 2021-01-01 PROCEDURE — 86901 BLOOD TYPING SEROLOGIC RH(D): CPT

## 2021-01-01 PROCEDURE — 85025 COMPLETE CBC W/AUTO DIFF WBC: CPT

## 2021-01-01 RX ORDER — IBUPROFEN 200 MG
600 TABLET ORAL EVERY 6 HOURS
Refills: 0 | Status: DISCONTINUED | OUTPATIENT
Start: 2021-01-01 | End: 2021-01-01

## 2021-01-01 RX ORDER — TETANUS TOXOID, REDUCED DIPHTHERIA TOXOID AND ACELLULAR PERTUSSIS VACCINE, ADSORBED 5; 2.5; 8; 8; 2.5 [IU]/.5ML; [IU]/.5ML; UG/.5ML; UG/.5ML; UG/.5ML
0.5 SUSPENSION INTRAMUSCULAR ONCE
Refills: 0 | Status: COMPLETED | OUTPATIENT
Start: 2021-01-01 | End: 2021-01-01

## 2021-01-01 RX ADMIN — Medication 30 MILLIGRAM(S): at 10:04

## 2021-01-01 RX ADMIN — Medication 975 MILLIGRAM(S): at 00:58

## 2021-01-01 RX ADMIN — Medication 975 MILLIGRAM(S): at 01:58

## 2021-01-01 RX ADMIN — MAGNESIUM HYDROXIDE 30 MILLILITER(S): 400 TABLET, CHEWABLE ORAL at 09:01

## 2021-01-01 RX ADMIN — Medication 600 MILLIGRAM(S): at 17:30

## 2021-01-01 RX ADMIN — Medication 975 MILLIGRAM(S): at 12:45

## 2021-01-01 RX ADMIN — Medication 30 MILLIGRAM(S): at 03:43

## 2021-01-01 RX ADMIN — Medication 30 MILLIGRAM(S): at 04:21

## 2021-01-01 RX ADMIN — HEPARIN SODIUM 5000 UNIT(S): 5000 INJECTION INTRAVENOUS; SUBCUTANEOUS at 09:00

## 2021-01-01 RX ADMIN — Medication 600 MILLIGRAM(S): at 16:25

## 2021-01-01 RX ADMIN — Medication 30 MILLIGRAM(S): at 09:11

## 2021-01-01 RX ADMIN — Medication 975 MILLIGRAM(S): at 06:34

## 2021-01-01 RX ADMIN — Medication 975 MILLIGRAM(S): at 06:03

## 2021-01-01 RX ADMIN — Medication 975 MILLIGRAM(S): at 11:43

## 2021-01-01 NOTE — PROGRESS NOTE ADULT - SUBJECTIVE AND OBJECTIVE BOX
OB Progress Note: LTCS, POD#2    S: 31yo COVID+ POD#2 s/p LTCS. Pain well controlled, tolerating regular diet, passing faltus, voiding spontaneously, ambulating without difficulty. Denies heavy vaginal bleeding, CP/SOB, lightheadedness/dizziness, nausea/vomiting. Denies cough, CP, SOB.     O:  Vitals:  Vital Signs Last 24 Hrs  T(C): 37.2 (01 Jan 2021 05:00), Max: 37.2 (01 Jan 2021 05:00)  T(F): 99 (01 Jan 2021 05:00), Max: 99 (01 Jan 2021 05:00)  HR: 76 (01 Jan 2021 05:00) (65 - 87)  BP: 118/83 (01 Jan 2021 05:00) (110/76 - 123/82)  BP(mean): --  RR: 18 (01 Jan 2021 05:00) (18 - 18)  SpO2: 99% (01 Jan 2021 05:00) (99% - 100%)    MEDICATIONS  (STANDING):  acetaminophen   Tablet .. 975 milliGRAM(s) Oral <User Schedule>  diphtheria/tetanus/pertussis (acellular) Vaccine (ADAcel) 0.5 milliLiter(s) IntraMuscular once  heparin   Injectable 5000 Unit(s) SubCutaneous every 12 hours  ibuprofen  Tablet. 600 milliGRAM(s) Oral every 6 hours  influenza   Vaccine 0.5 milliLiter(s) IntraMuscular once  ketorolac   Injectable 30 milliGRAM(s) IV Push every 6 hours  oxytocin Infusion 333.333 milliUNIT(s)/Min (1000 mL/Hr) IV Continuous <Continuous>      MEDICATIONS  (PRN):  diphenhydrAMINE 25 milliGRAM(s) Oral every 6 hours PRN Pruritus  lanolin Ointment 1 Application(s) Topical every 6 hours PRN Sore Nipples  magnesium hydroxide Suspension 30 milliLiter(s) Oral two times a day PRN Constipation  oxyCODONE    IR 5 milliGRAM(s) Oral every 3 hours PRN Moderate to Severe Pain (4-10)  oxyCODONE    IR 5 milliGRAM(s) Oral once PRN Moderate to Severe Pain (4-10)  simethicone 80 milliGRAM(s) Chew every 4 hours PRN Gas      Labs:  Blood type: AB Positive  Rubella IgG: RPR: Negative                          11.2<L>   9.28 >-----------< 279    ( 12-31 @ 06:56 )             34.5                        13.5   6.68 >-----------< 328    ( 12-30 @ 09:20 )             40.0                  PE:  General: NAD  CV: RRR   Resp: CTAB  Abdomen: Soft, appropriately tender, Fundus firm incision c/d/i.  : Nl lochia  Extremities: No erythema, no pitting edema

## 2021-01-01 NOTE — PROGRESS NOTE ADULT - ASSESSMENT
A/P: 29yo COVID+ POD#2 s/p LTCS.  Patient is stable and doing well post-operatively. Afebrile, no sxs of COVID.

## 2021-01-01 NOTE — CHART NOTE - NSCHARTNOTEFT_GEN_A_CORE
R1 Eval Note     Called to bedside by RN to evaluate pt, c/o RLE swelling. Patient seen and examined at bedside. PPD#2 from LTCS, COVID+ patient states that her R leg appears more swollen than her left. Denies calf pain, warmth, erythema. Patient with +Covid test on admission, however has otherwise been asymptomatic. On Heparin 5000U BID for DVT ppx after c/s. Denies fever, CP, palpitations, SOB, cough.    ICU Vital Signs Last 24 Hrs  T(C): 36.8 (01 Jan 2021 08:55), Max: 37.2 (01 Jan 2021 05:00)  T(F): 98.2 (01 Jan 2021 08:55), Max: 99 (01 Jan 2021 05:00)  HR: 77 (01 Jan 2021 08:55) (76 - 87)  BP: 118/83 (01 Jan 2021 08:55) (111/76 - 123/82)  RR: 20 (01 Jan 2021 08:55) (18 - 20)  SpO2: 100% (01 Jan 2021 08:55) (99% - 100%)    PE:   Gen: Well appearing R1 Eval Note     Called to bedside by RN to evaluate pt, c/o RLE swelling. Patient seen and examined at bedside. PPD#2 from [facility], COVID+ patient states that her R leg appears more swollen than her left. Denies calf pain, warmth, erythema. Patient with +Covid test on admission, however has otherwise been asymptomatic. On Heparin 5000U BID for DVT ppx after c/s. Denies fever, CP, palpitations, SOB, cough.    ICU Vital Signs Last 24 Hrs  T(C): 36.8 (01 Jan 2021 08:55), Max: 37.2 (01 Jan 2021 05:00)  T(F): 98.2 (01 Jan 2021 08:55), Max: 99 (01 Jan 2021 05:00)  HR: 77 (01 Jan 2021 08:55) (76 - 87)  BP: 118/83 (01 Jan 2021 08:55) (111/76 - 123/82)  RR: 20 (01 Jan 2021 08:55) (18 - 20)  SpO2: 100% (01 Jan 2021 08:55) (99% - 100%)    PE:   Gen: Well appearing, NAD. Out of bed.   Resp: CTAB  CV: RRR  Ext: bilateral lower extremities with 1+pedal edema, R slightly more swollen than L. No calf tenderness to palpation. No warmth or erythema. Negative flori's    A/P   POD#2 from [facility] COVID+ patient complaining of RLE swelling. On exam, both LE mildly swollen, R slightly greater than L. No other clinical signs of DVT, however patient at elevated risk of DVT given postpartum, post-op, and COVID+.  -RLE duplex now   -If duplex is negative, can be d/c home as planned   -otherwise continue routine PP care     d/w Dr. Dawit Faulkner MD PGY1

## 2021-01-01 NOTE — PROGRESS NOTE ADULT - PROBLEM SELECTOR PLAN 1
- Continue regular diet  - Increase ambulation  - Continue motrin, tylenol, oxycodone PRN for pain control.

## 2021-01-05 LAB — SURGICAL PATHOLOGY STUDY: SIGNIFICANT CHANGE UP

## 2021-03-04 PROBLEM — Z00.00 ENCOUNTER FOR PREVENTIVE HEALTH EXAMINATION: Status: ACTIVE | Noted: 2021-03-04

## 2021-03-17 NOTE — BRIEF OPERATIVE NOTE - NSICDXBRIEFOPLAUNCH_GEN_ALL_CORE
How Many Skin Cancers Have You Had?: one What Is The Reason For Today's Visit?: History of Non-Melanoma Skin Cancer When Was Your Last Cancer Diagnosed?: 6/2020 <--- Click to Launch ICDx for PreOp, PostOp and Procedure

## 2024-06-04 NOTE — OB RN PATIENT PROFILE - HAS THE PATIENT RECEIVED THE INFLUENZA VACCINE THIS SEASON?
no...

## 2024-11-05 ENCOUNTER — EMERGENCY (EMERGENCY)
Facility: HOSPITAL | Age: 34
LOS: 1 days | Discharge: HOME | End: 2024-11-05
Attending: EMERGENCY MEDICINE
Payer: COMMERCIAL

## 2024-11-05 VITALS
OXYGEN SATURATION: 99 % | SYSTOLIC BLOOD PRESSURE: 130 MMHG | HEIGHT: 63 IN | RESPIRATION RATE: 16 BRPM | DIASTOLIC BLOOD PRESSURE: 83 MMHG | WEIGHT: 200.62 LBS | HEART RATE: 86 BPM | TEMPERATURE: 99 F

## 2024-11-05 DIAGNOSIS — O34.219 MATERNAL CARE FOR UNSPECIFIED TYPE SCAR FROM PREVIOUS CESAREAN DELIVERY: Chronic | ICD-10-CM

## 2024-11-05 LAB
ALBUMIN SERPL ELPH-MCNC: 3.5 G/DL — SIGNIFICANT CHANGE UP (ref 3.5–5)
ALP SERPL-CCNC: 79 U/L — SIGNIFICANT CHANGE UP (ref 40–120)
ALT FLD-CCNC: 22 U/L DA — SIGNIFICANT CHANGE UP (ref 10–60)
ANION GAP SERPL CALC-SCNC: 6 MMOL/L — SIGNIFICANT CHANGE UP (ref 5–17)
AST SERPL-CCNC: 14 U/L — SIGNIFICANT CHANGE UP (ref 10–40)
BASOPHILS # BLD AUTO: 0.07 K/UL — SIGNIFICANT CHANGE UP (ref 0–0.2)
BASOPHILS NFR BLD AUTO: 1 % — SIGNIFICANT CHANGE UP (ref 0–2)
BILIRUB SERPL-MCNC: 0.3 MG/DL — SIGNIFICANT CHANGE UP (ref 0.2–1.2)
BUN SERPL-MCNC: 10 MG/DL — SIGNIFICANT CHANGE UP (ref 7–18)
CALCIUM SERPL-MCNC: 8.7 MG/DL — SIGNIFICANT CHANGE UP (ref 8.4–10.5)
CHLORIDE SERPL-SCNC: 109 MMOL/L — HIGH (ref 96–108)
CO2 SERPL-SCNC: 22 MMOL/L — SIGNIFICANT CHANGE UP (ref 22–31)
CREAT SERPL-MCNC: 0.75 MG/DL — SIGNIFICANT CHANGE UP (ref 0.5–1.3)
EGFR: 106 ML/MIN/1.73M2 — SIGNIFICANT CHANGE UP
EOSINOPHIL # BLD AUTO: 0.14 K/UL — SIGNIFICANT CHANGE UP (ref 0–0.5)
EOSINOPHIL NFR BLD AUTO: 2 % — SIGNIFICANT CHANGE UP (ref 0–6)
GLUCOSE SERPL-MCNC: 91 MG/DL — SIGNIFICANT CHANGE UP (ref 70–99)
HCG SERPL-ACNC: <1 MIU/ML — SIGNIFICANT CHANGE UP
HCT VFR BLD CALC: 45.1 % — HIGH (ref 34.5–45)
HGB BLD-MCNC: 14.9 G/DL — SIGNIFICANT CHANGE UP (ref 11.5–15.5)
IMM GRANULOCYTES NFR BLD AUTO: 0.3 % — SIGNIFICANT CHANGE UP (ref 0–0.9)
LYMPHOCYTES # BLD AUTO: 2.17 K/UL — SIGNIFICANT CHANGE UP (ref 1–3.3)
LYMPHOCYTES # BLD AUTO: 30.9 % — SIGNIFICANT CHANGE UP (ref 13–44)
MAGNESIUM SERPL-MCNC: 2.1 MG/DL — SIGNIFICANT CHANGE UP (ref 1.6–2.6)
MCHC RBC-ENTMCNC: 30.2 PG — SIGNIFICANT CHANGE UP (ref 27–34)
MCHC RBC-ENTMCNC: 33 G/DL — SIGNIFICANT CHANGE UP (ref 32–36)
MCV RBC AUTO: 91.5 FL — SIGNIFICANT CHANGE UP (ref 80–100)
MONOCYTES # BLD AUTO: 0.63 K/UL — SIGNIFICANT CHANGE UP (ref 0–0.9)
MONOCYTES NFR BLD AUTO: 9 % — SIGNIFICANT CHANGE UP (ref 2–14)
NEUTROPHILS # BLD AUTO: 3.99 K/UL — SIGNIFICANT CHANGE UP (ref 1.8–7.4)
NEUTROPHILS NFR BLD AUTO: 56.8 % — SIGNIFICANT CHANGE UP (ref 43–77)
NRBC # BLD: 0 /100 WBCS — SIGNIFICANT CHANGE UP (ref 0–0)
PLATELET # BLD AUTO: 357 K/UL — SIGNIFICANT CHANGE UP (ref 150–400)
POTASSIUM SERPL-MCNC: 4 MMOL/L — SIGNIFICANT CHANGE UP (ref 3.5–5.3)
POTASSIUM SERPL-SCNC: 4 MMOL/L — SIGNIFICANT CHANGE UP (ref 3.5–5.3)
PROT SERPL-MCNC: 7.7 G/DL — SIGNIFICANT CHANGE UP (ref 6–8.3)
RBC # BLD: 4.93 M/UL — SIGNIFICANT CHANGE UP (ref 3.8–5.2)
RBC # FLD: 12.3 % — SIGNIFICANT CHANGE UP (ref 10.3–14.5)
SODIUM SERPL-SCNC: 137 MMOL/L — SIGNIFICANT CHANGE UP (ref 135–145)
WBC # BLD: 7.02 K/UL — SIGNIFICANT CHANGE UP (ref 3.8–10.5)
WBC # FLD AUTO: 7.02 K/UL — SIGNIFICANT CHANGE UP (ref 3.8–10.5)

## 2024-11-05 PROCEDURE — 70450 CT HEAD/BRAIN W/O DYE: CPT | Mod: MC

## 2024-11-05 PROCEDURE — 70450 CT HEAD/BRAIN W/O DYE: CPT | Mod: 26,MC

## 2024-11-05 PROCEDURE — 99285 EMERGENCY DEPT VISIT HI MDM: CPT | Mod: 25

## 2024-11-05 PROCEDURE — 83735 ASSAY OF MAGNESIUM: CPT

## 2024-11-05 PROCEDURE — 99284 EMERGENCY DEPT VISIT MOD MDM: CPT

## 2024-11-05 PROCEDURE — 93005 ELECTROCARDIOGRAM TRACING: CPT

## 2024-11-05 PROCEDURE — 85025 COMPLETE CBC W/AUTO DIFF WBC: CPT

## 2024-11-05 PROCEDURE — 84702 CHORIONIC GONADOTROPIN TEST: CPT

## 2024-11-05 PROCEDURE — 36415 COLL VENOUS BLD VENIPUNCTURE: CPT

## 2024-11-05 PROCEDURE — 80053 COMPREHEN METABOLIC PANEL: CPT

## 2024-11-05 RX ORDER — MECLIZINE HCL 25 MG
1 TABLET ORAL
Qty: 21 | Refills: 0
Start: 2024-11-05 | End: 2024-11-11

## 2024-11-05 RX ORDER — MECLIZINE HCL 25 MG
50 TABLET ORAL ONCE
Refills: 0 | Status: COMPLETED | OUTPATIENT
Start: 2024-11-05 | End: 2024-11-05

## 2024-11-05 RX ADMIN — Medication 50 MILLIGRAM(S): at 16:17

## 2024-11-05 NOTE — ED PROVIDER NOTE - CARE PROVIDER_API CALL
Breann Parham.  Neurology  [address]  [place] [address]  Phone: [phone]  Fax: [phone]  Follow Up Time:

## 2024-11-05 NOTE — ED PROVIDER NOTE - PATIENT PORTAL LINK FT
You can access the FollowMyHealth Patient Portal offered by [facility] by registering at the following website: [email or web address]. By joining [employer]’s FollowMyHealth portal, you will also be able to view your health information using other applications (apps) compatible with our system.

## 2024-11-05 NOTE — ED PROVIDER NOTE - NSPTACCESSSVCSAPPTDETAILS_ED_ALL_ED_FT
Patient has vertigo for the past week.  Needs to follow-up with neurologist and will likely need an MRI of her brain.  Ideally follow-up within 1 week.

## 2024-11-05 NOTE — ED PROVIDER NOTE - CLINICAL SUMMARY MEDICAL DECISION MAKING FREE TEXT BOX
34-year-old female presents with 1 week of intermittent room spinning sensation.  PE as above.    CT head, labs, EKG, meclizine, reassess.

## 2024-11-05 NOTE — ED PROVIDER NOTE - OBJECTIVE STATEMENT
34-year-old female denies past medical history coming in with a 1 week history of intermittent room spinning sensation and sensation like she is on a boat.  Patient states this intermittent although feeling more frequent lately.  States that with movement it seems to feel worse.  States also when she lays down it feels worse.  Never had symptoms like this before.  Denies other complaints at this time.

## 2024-11-05 NOTE — ED PROVIDER NOTE - PROVIDER TOKENS
Patient provided discharge paperwork and instructions. Follow up care reviewed- therapy appt scheduled for this Thursday. Educated on return precautions. Agrees to learning. Friend to  from lobby. Ambulates to exit with even and steady gait.   PROVIDER:[TOKEN:[[id number]]]

## 2024-11-05 NOTE — ED PROVIDER NOTE - PROGRESS NOTE DETAILS
Labs are unremarkable.  CT head is negative.  Feels mildly improved.  Will discharge with meclizine.  Follow-up with neurology.  Return precautions discussed.

## 2024-12-13 NOTE — OB RN PREOPERATIVE CHECKLIST - ISOLATION PRECAUTIONS
No protocol for requested medication.    Medication: gabapentin (NEURONTIN) 300 MG capsule   Last office visit date: 10/01/2024  Pharmacy: [facility] #[id number] - [place] [address] [facility] [address]    Order pended, routed to clinician for review.    
contact/airborne
